# Patient Record
Sex: MALE | Race: WHITE | NOT HISPANIC OR LATINO | URBAN - METROPOLITAN AREA
[De-identification: names, ages, dates, MRNs, and addresses within clinical notes are randomized per-mention and may not be internally consistent; named-entity substitution may affect disease eponyms.]

---

## 2021-09-04 ENCOUNTER — APPOINTMENT (OUTPATIENT)
Dept: CT IMAGING | Facility: HOSPITAL | Age: 51
End: 2021-09-04

## 2021-09-04 ENCOUNTER — HOSPITAL ENCOUNTER (EMERGENCY)
Facility: HOSPITAL | Age: 51
Discharge: HOME | End: 2021-09-04
Attending: EMERGENCY MEDICINE | Admitting: EMERGENCY MEDICINE

## 2021-09-04 VITALS
HEIGHT: 69 IN | DIASTOLIC BLOOD PRESSURE: 70 MMHG | TEMPERATURE: 97.9 F | WEIGHT: 190 LBS | RESPIRATION RATE: 16 BRPM | HEART RATE: 65 BPM | BODY MASS INDEX: 28.14 KG/M2 | SYSTOLIC BLOOD PRESSURE: 123 MMHG | OXYGEN SATURATION: 96 %

## 2021-09-04 DIAGNOSIS — K57.92 ACUTE DIVERTICULITIS: Primary | ICD-10-CM

## 2021-09-04 DIAGNOSIS — K92.1 HEMATOCHEZIA: ICD-10-CM

## 2021-09-04 LAB
ABO GROUP BLD: NORMAL
ABO GROUP BLD: NORMAL
ALBUMIN SERPL-MCNC: 4.5 G/DL (ref 3.5–5.2)
ALBUMIN/GLOB SERPL: 1.7 G/DL
ALP SERPL-CCNC: 61 U/L (ref 39–117)
ALT SERPL W P-5'-P-CCNC: 26 U/L (ref 1–41)
ANION GAP SERPL CALCULATED.3IONS-SCNC: 12 MMOL/L (ref 5–15)
AST SERPL-CCNC: 24 U/L (ref 1–40)
BASOPHILS # BLD AUTO: 0.02 10*3/MM3 (ref 0–0.2)
BASOPHILS NFR BLD AUTO: 0.2 % (ref 0–1.5)
BILIRUB SERPL-MCNC: 0.7 MG/DL (ref 0–1.2)
BLD GP AB SCN SERPL QL: NEGATIVE
BUN SERPL-MCNC: 23 MG/DL (ref 6–20)
BUN/CREAT SERPL: 22.5 (ref 7–25)
CALCIUM SPEC-SCNC: 9.4 MG/DL (ref 8.6–10.5)
CHLORIDE SERPL-SCNC: 103 MMOL/L (ref 98–107)
CO2 SERPL-SCNC: 24 MMOL/L (ref 22–29)
CREAT SERPL-MCNC: 1.02 MG/DL (ref 0.76–1.27)
D-LACTATE SERPL-SCNC: 1 MMOL/L (ref 0.5–2)
DEPRECATED RDW RBC AUTO: 44.2 FL (ref 37–54)
DEVELOPER EXPIRATION DATE: ABNORMAL
DEVELOPER LOT NUMBER: ABNORMAL
EOSINOPHIL # BLD AUTO: 0.02 10*3/MM3 (ref 0–0.4)
EOSINOPHIL NFR BLD AUTO: 0.2 % (ref 0.3–6.2)
ERYTHROCYTE [DISTWIDTH] IN BLOOD BY AUTOMATED COUNT: 13.2 % (ref 12.3–15.4)
EXPIRATION DATE: ABNORMAL
FECAL OCCULT BLOOD SCREEN, POC: POSITIVE
GFR SERPL CREATININE-BSD FRML MDRD: 77 ML/MIN/1.73
GLOBULIN UR ELPH-MCNC: 2.7 GM/DL
GLUCOSE SERPL-MCNC: 113 MG/DL (ref 65–99)
HCT VFR BLD AUTO: 44.6 % (ref 37.5–51)
HGB BLD-MCNC: 14.7 G/DL (ref 13–17.7)
IMM GRANULOCYTES # BLD AUTO: 0.05 10*3/MM3 (ref 0–0.05)
IMM GRANULOCYTES NFR BLD AUTO: 0.5 % (ref 0–0.5)
LIPASE SERPL-CCNC: 34 U/L (ref 13–60)
LYMPHOCYTES # BLD AUTO: 1.14 10*3/MM3 (ref 0.7–3.1)
LYMPHOCYTES NFR BLD AUTO: 10.5 % (ref 19.6–45.3)
Lab: ABNORMAL
MCH RBC QN AUTO: 29.9 PG (ref 26.6–33)
MCHC RBC AUTO-ENTMCNC: 33 G/DL (ref 31.5–35.7)
MCV RBC AUTO: 90.8 FL (ref 79–97)
MONOCYTES # BLD AUTO: 0.31 10*3/MM3 (ref 0.1–0.9)
MONOCYTES NFR BLD AUTO: 2.9 % (ref 5–12)
NEGATIVE CONTROL: NEGATIVE
NEUTROPHILS NFR BLD AUTO: 85.7 % (ref 42.7–76)
NEUTROPHILS NFR BLD AUTO: 9.33 10*3/MM3 (ref 1.7–7)
NRBC BLD AUTO-RTO: 0 /100 WBC (ref 0–0.2)
PLATELET # BLD AUTO: 198 10*3/MM3 (ref 140–450)
PMV BLD AUTO: 10.9 FL (ref 6–12)
POSITIVE CONTROL: POSITIVE
POTASSIUM SERPL-SCNC: 4 MMOL/L (ref 3.5–5.2)
PROT SERPL-MCNC: 7.2 G/DL (ref 6–8.5)
RBC # BLD AUTO: 4.91 10*6/MM3 (ref 4.14–5.8)
RH BLD: POSITIVE
RH BLD: POSITIVE
SODIUM SERPL-SCNC: 139 MMOL/L (ref 136–145)
T&S EXPIRATION DATE: NORMAL
WBC # BLD AUTO: 10.87 10*3/MM3 (ref 3.4–10.8)

## 2021-09-04 PROCEDURE — 85025 COMPLETE CBC W/AUTO DIFF WBC: CPT | Performed by: PHYSICIAN ASSISTANT

## 2021-09-04 PROCEDURE — 25010000002 ONDANSETRON PER 1 MG: Performed by: EMERGENCY MEDICINE

## 2021-09-04 PROCEDURE — 25010000002 IOPAMIDOL 61 % SOLUTION: Performed by: EMERGENCY MEDICINE

## 2021-09-04 PROCEDURE — 80053 COMPREHEN METABOLIC PANEL: CPT | Performed by: PHYSICIAN ASSISTANT

## 2021-09-04 PROCEDURE — 96375 TX/PRO/DX INJ NEW DRUG ADDON: CPT

## 2021-09-04 PROCEDURE — 86850 RBC ANTIBODY SCREEN: CPT | Performed by: PHYSICIAN ASSISTANT

## 2021-09-04 PROCEDURE — 82270 OCCULT BLOOD FECES: CPT | Performed by: PHYSICIAN ASSISTANT

## 2021-09-04 PROCEDURE — 25010000002 HYDROMORPHONE PER 4 MG: Performed by: EMERGENCY MEDICINE

## 2021-09-04 PROCEDURE — 99283 EMERGENCY DEPT VISIT LOW MDM: CPT

## 2021-09-04 PROCEDURE — 83690 ASSAY OF LIPASE: CPT | Performed by: PHYSICIAN ASSISTANT

## 2021-09-04 PROCEDURE — 83605 ASSAY OF LACTIC ACID: CPT | Performed by: PHYSICIAN ASSISTANT

## 2021-09-04 PROCEDURE — 86900 BLOOD TYPING SEROLOGIC ABO: CPT

## 2021-09-04 PROCEDURE — 86901 BLOOD TYPING SEROLOGIC RH(D): CPT

## 2021-09-04 PROCEDURE — 86901 BLOOD TYPING SEROLOGIC RH(D): CPT | Performed by: PHYSICIAN ASSISTANT

## 2021-09-04 PROCEDURE — 86900 BLOOD TYPING SEROLOGIC ABO: CPT | Performed by: PHYSICIAN ASSISTANT

## 2021-09-04 PROCEDURE — 96374 THER/PROPH/DIAG INJ IV PUSH: CPT

## 2021-09-04 PROCEDURE — 74177 CT ABD & PELVIS W/CONTRAST: CPT

## 2021-09-04 RX ORDER — AMOXICILLIN AND CLAVULANATE POTASSIUM 875; 125 MG/1; MG/1
1 TABLET, FILM COATED ORAL 2 TIMES DAILY
Qty: 20 TABLET | Refills: 0 | Status: SHIPPED | OUTPATIENT
Start: 2021-09-04

## 2021-09-04 RX ORDER — ONDANSETRON 2 MG/ML
4 INJECTION INTRAMUSCULAR; INTRAVENOUS ONCE
Status: COMPLETED | OUTPATIENT
Start: 2021-09-04 | End: 2021-09-04

## 2021-09-04 RX ORDER — HYDROMORPHONE HYDROCHLORIDE 1 MG/ML
0.25 INJECTION, SOLUTION INTRAMUSCULAR; INTRAVENOUS; SUBCUTANEOUS ONCE
Status: COMPLETED | OUTPATIENT
Start: 2021-09-04 | End: 2021-09-04

## 2021-09-04 RX ORDER — DICYCLOMINE HCL 20 MG
20 TABLET ORAL EVERY 6 HOURS
Qty: 12 TABLET | Refills: 0 | Status: SHIPPED | OUTPATIENT
Start: 2021-09-04

## 2021-09-04 RX ADMIN — HYDROMORPHONE HYDROCHLORIDE 0.25 MG: 1 INJECTION, SOLUTION INTRAMUSCULAR; INTRAVENOUS; SUBCUTANEOUS at 14:40

## 2021-09-04 RX ADMIN — ONDANSETRON 4 MG: 2 INJECTION INTRAMUSCULAR; INTRAVENOUS at 14:38

## 2021-09-04 RX ADMIN — IOPAMIDOL 85 ML: 612 INJECTION, SOLUTION INTRAVENOUS at 11:45

## 2021-09-04 RX ADMIN — SODIUM CHLORIDE 1000 ML: 9 INJECTION, SOLUTION INTRAVENOUS at 10:57

## 2021-09-04 NOTE — ED PROVIDER NOTES
Subjective   51-year-old male presents emergency department today after being seen at the [facility] and sent here for further evaluation.  Said some blood in his loose stools over the past 2 days.  Patient is a colonoscopy within the past year.  He reports that he has had no prior history of colitis diverticulitis or otherwise.  Said no fevers no chills.  He had no rectal trauma.  He has had a lower abdominal cramping associated with this.  No other complaints.      History provided by:  Patient   used: No    Rectal Bleeding  Quality:  Bright red  Amount:  Moderate  Duration:  12 hours  Timing:  Intermittent  Chronicity:  New  Context: defecation    Context: not anal fissures, not anal penetration, not constipation, not foreign body and not spontaneously    Similar prior episodes: no    Relieved by:  Nothing  Worsened by:  Defecation  Ineffective treatments:  None tried  Associated symptoms: abdominal pain    Associated symptoms: no dizziness, no epistaxis, no fever, no hematemesis, no light-headedness, no loss of consciousness, no recent illness and no vomiting    Risk factors: no anticoagulant use, no hx of colorectal cancer and no hx of colorectal surgery        Review of Systems   Constitutional: Negative for appetite change and fever.   HENT: Negative for nosebleeds.    Respiratory: Negative for chest tightness, shortness of breath and wheezing.    Cardiovascular: Negative for chest pain and palpitations.   Gastrointestinal: Positive for abdominal pain and hematochezia. Negative for hematemesis and vomiting.   Genitourinary: Negative for dysuria, frequency and urgency.   Musculoskeletal: Negative for back pain and neck pain.   Skin: Negative for pallor and rash.   Neurological: Negative for dizziness, loss of consciousness and light-headedness.   Psychiatric/Behavioral: Negative.    All other systems reviewed and are negative.      History reviewed. No pertinent past medical history.    No Known  Allergies    History reviewed. No pertinent surgical history.    History reviewed. No pertinent family history.    Social History     Socioeconomic History   • Marital status:      Spouse name: Not on file   • Number of children: Not on file   • Years of education: Not on file   • Highest education level: Not on file   Tobacco Use   • Smoking status: Never Smoker   • Smokeless tobacco: Never Used   Vaping Use   • Vaping Use: Never used   Substance and Sexual Activity   • Alcohol use: Yes     Comment: rare   • Drug use: Never   • Sexual activity: Defer           Objective   Physical Exam  Vitals and nursing note reviewed.   Constitutional:       Appearance: He is well-developed.   HENT:      Head: Normocephalic and atraumatic.      Right Ear: External ear normal.      Left Ear: External ear normal.      Nose: Nose normal.   Eyes:      General: No scleral icterus.     Conjunctiva/sclera: Conjunctivae normal.      Pupils: Pupils are equal, round, and reactive to light.   Neck:      Thyroid: No thyromegaly.   Cardiovascular:      Rate and Rhythm: Normal rate and regular rhythm.      Heart sounds: Normal heart sounds.   Pulmonary:      Effort: Pulmonary effort is normal. No respiratory distress.      Breath sounds: Normal breath sounds. No wheezing or rales.   Chest:      Chest wall: No tenderness.   Abdominal:      General: Bowel sounds are normal. There is no distension.      Palpations: Abdomen is soft.      Tenderness: There is abdominal tenderness in the suprapubic area. There is no right CVA tenderness, left CVA tenderness, guarding or rebound. Negative signs include Minaya's sign, Rovsing's sign, McBurney's sign, psoas sign and obturator sign.   Musculoskeletal:         General: Normal range of motion.      Cervical back: Normal range of motion.   Lymphadenopathy:      Cervical: No cervical adenopathy.   Skin:     General: Skin is warm and dry.   Neurological:      Mental Status: He is alert and oriented to  person, place, and time.      Cranial Nerves: No cranial nerve deficit.      Coordination: Coordination normal.      Deep Tendon Reflexes: Reflexes are normal and symmetric. Reflexes normal.   Psychiatric:         Behavior: Behavior normal.         Thought Content: Thought content normal.         Judgment: Judgment normal.         Procedures           ED Course                                 Recent Results (from the past 24 hour(s))   POCT Hemoglobin    Collection Time: 09/04/21  9:47 AM    Specimen: Blood   Result Value Ref Range    Hemoglobin 14.8 12.0 - 17.0 g/dL   Comprehensive Metabolic Panel    Collection Time: 09/04/21 10:57 AM    Specimen: Blood   Result Value Ref Range    Glucose 113 (H) 65 - 99 mg/dL    BUN 23 (H) 6 - 20 mg/dL    Creatinine 1.02 0.76 - 1.27 mg/dL    Sodium 139 136 - 145 mmol/L    Potassium 4.0 3.5 - 5.2 mmol/L    Chloride 103 98 - 107 mmol/L    CO2 24.0 22.0 - 29.0 mmol/L    Calcium 9.4 8.6 - 10.5 mg/dL    Total Protein 7.2 6.0 - 8.5 g/dL    Albumin 4.50 3.50 - 5.20 g/dL    ALT (SGPT) 26 1 - 41 U/L    AST (SGOT) 24 1 - 40 U/L    Alkaline Phosphatase 61 39 - 117 U/L    Total Bilirubin 0.7 0.0 - 1.2 mg/dL    eGFR Non African Amer 77 >60 mL/min/1.73    Globulin 2.7 gm/dL    A/G Ratio 1.7 g/dL    BUN/Creatinine Ratio 22.5 7.0 - 25.0    Anion Gap 12.0 5.0 - 15.0 mmol/L   Lactic Acid, Plasma    Collection Time: 09/04/21 10:57 AM    Specimen: Blood   Result Value Ref Range    Lactate 1.0 0.5 - 2.0 mmol/L   Lipase    Collection Time: 09/04/21 10:57 AM    Specimen: Blood   Result Value Ref Range    Lipase 34 13 - 60 U/L   CBC Auto Differential    Collection Time: 09/04/21 10:57 AM    Specimen: Blood   Result Value Ref Range    WBC 10.87 (H) 3.40 - 10.80 10*3/mm3    RBC 4.91 4.14 - 5.80 10*6/mm3    Hemoglobin 14.7 13.0 - 17.7 g/dL    Hematocrit 44.6 37.5 - 51.0 %    MCV 90.8 79.0 - 97.0 fL    MCH 29.9 26.6 - 33.0 pg    MCHC 33.0 31.5 - 35.7 g/dL    RDW 13.2 12.3 - 15.4 %    RDW-SD 44.2 37.0 - 54.0  fl    MPV 10.9 6.0 - 12.0 fL    Platelets 198 140 - 450 10*3/mm3    Neutrophil % 85.7 (H) 42.7 - 76.0 %    Lymphocyte % 10.5 (L) 19.6 - 45.3 %    Monocyte % 2.9 (L) 5.0 - 12.0 %    Eosinophil % 0.2 (L) 0.3 - 6.2 %    Basophil % 0.2 0.0 - 1.5 %    Immature Grans % 0.5 0.0 - 0.5 %    Neutrophils, Absolute 9.33 (H) 1.70 - 7.00 10*3/mm3    Lymphocytes, Absolute 1.14 0.70 - 3.10 10*3/mm3    Monocytes, Absolute 0.31 0.10 - 0.90 10*3/mm3    Eosinophils, Absolute 0.02 0.00 - 0.40 10*3/mm3    Basophils, Absolute 0.02 0.00 - 0.20 10*3/mm3    Immature Grans, Absolute 0.05 0.00 - 0.05 10*3/mm3    nRBC 0.0 0.0 - 0.2 /100 WBC   Type & Screen    Collection Time: 09/04/21 11:10 AM    Specimen: Blood   Result Value Ref Range    ABO Type A     RH type Positive     Antibody Screen Negative     T&S Expiration Date 9/7/2021 11:59:59 PM    POC Occult Blood Stool    Collection Time: 09/04/21 11:29 AM    Specimen: Per Rectum; Stool   Result Value Ref Range    Fecal Occult Blood Positive (A) Negative    Lot Number [id number]     Expiration Date 2/2,024     DEVELOPER LOT NUMBER [id number]     DEVELOPER EXPIRATION DATE 6/2,024     Positive Control Positive Positive    Negative Control Negative Negative   ABO RH Specimen Verification    Collection Time: 09/04/21 12:31 PM    Specimen: Blood   Result Value Ref Range    ABO Type A     RH type Positive      Note: In addition to lab results from this visit, the labs listed above may include labs taken at another facility or during a different encounter within the last 24 hours. Please correlate lab times with ED admission and discharge times for further clarification of the services performed during this visit.    CT Abdomen Pelvis With Contrast   Final Result   No acute findings in the abdomen and pelvis.       Likely incidental prominent presumed diverticulum of the transverse   portion of the duodenum.           This report was finalized on 9/4/2021 12:24 PM by Ryan Rolle.            Vitals:     09/04/21 1445 09/04/21 1500 09/04/21 1515 09/04/21 1526   BP:    123/70   BP Location:       Patient Position:       Pulse:    65   Resp:    16   Temp:       SpO2: 96% 93% 95% 96%   Weight:       Height:         Medications   sodium chloride 0.9 % bolus 1,000 mL (0 mL Intravenous Stopped 9/4/21 1437)   iopamidol (ISOVUE-300) 61 % injection 100 mL (85 mL Intravenous Given 9/4/21 1145)   HYDROmorphone (DILAUDID) injection 0.25 mg (0.25 mg Intravenous Given 9/4/21 1440)   ondansetron (ZOFRAN) injection 4 mg (4 mg Intravenous Given 9/4/21 1438)     ECG/EMG Results (last 24 hours)     ** No results found for the last 24 hours. **        No orders to display                 MDM  Number of Diagnoses or Management Options  Acute diverticulitis: new and requires workup  Hematochezia: new and requires workup     Amount and/or Complexity of Data Reviewed  Clinical lab tests: reviewed and ordered  Tests in the radiology section of CPT®: reviewed and ordered  Tests in the medicine section of CPT®: ordered and reviewed  Discuss the patient with other providers: yes    Patient Progress  Patient progress: stable      Final diagnoses:   Acute diverticulitis   Hematochezia       ED Disposition  ED Disposition     ED Disposition Condition Comment    Discharge Stable           PATIENT CONNECTION - [facility] [address]  [phone]        [facility] Emergency Department  [address] [id number] [phone]    If symptoms worsen         Medication List      New Prescriptions    amoxicillin-clavulanate 875-125 MG per tablet  Commonly known as: AUGMENTIN  Take 1 tablet by mouth 2 (Two) Times a Day.     dicyclomine 20 MG tablet  Commonly known as: BENTYL  Take 1 tablet by mouth Every 6 (Six) Hours.           Where to Get Your Medications      These medications were sent to [id number] - [place], [place] - [address] [place] [address] - [phone] [facility] [phone] [facility]  [place] [id number]    Phone: [phone]   · amoxicillin-clavulanate 875-125 MG per tablet  · dicyclomine 20 MG tablet          Power Alexander PA  09/05/21 0755

## 2024-12-23 ENCOUNTER — APPOINTMENT (OUTPATIENT)
Dept: CT IMAGING | Facility: HOSPITAL | Age: 54
End: 2024-12-23
Payer: COMMERCIAL

## 2024-12-23 ENCOUNTER — HOSPITAL ENCOUNTER (EMERGENCY)
Facility: HOSPITAL | Age: 54
Discharge: HOME | End: 2024-12-23
Attending: STUDENT IN AN ORGANIZED HEALTH CARE EDUCATION/TRAINING PROGRAM | Admitting: STUDENT IN AN ORGANIZED HEALTH CARE EDUCATION/TRAINING PROGRAM
Payer: COMMERCIAL

## 2024-12-23 ENCOUNTER — APPOINTMENT (OUTPATIENT)
Dept: GENERAL RADIOLOGY | Facility: HOSPITAL | Age: 54
End: 2024-12-23
Payer: COMMERCIAL

## 2024-12-23 VITALS
HEIGHT: 69 IN | OXYGEN SATURATION: 95 % | HEART RATE: 73 BPM | TEMPERATURE: 97.7 F | RESPIRATION RATE: 16 BRPM | DIASTOLIC BLOOD PRESSURE: 112 MMHG | SYSTOLIC BLOOD PRESSURE: 140 MMHG | BODY MASS INDEX: 28.14 KG/M2 | WEIGHT: 190 LBS

## 2024-12-23 DIAGNOSIS — M54.6 ACUTE LEFT-SIDED THORACIC BACK PAIN: ICD-10-CM

## 2024-12-23 DIAGNOSIS — R03.0 TRANSIENT HYPERTENSION: ICD-10-CM

## 2024-12-23 DIAGNOSIS — R07.9 CHEST PAIN, UNSPECIFIED TYPE: Primary | ICD-10-CM

## 2024-12-23 LAB
ALBUMIN SERPL-MCNC: 4.3 G/DL (ref 3.5–5.2)
ALBUMIN/GLOB SERPL: 1.4 G/DL
ALP SERPL-CCNC: 67 U/L (ref 39–117)
ALT SERPL W P-5'-P-CCNC: 44 U/L (ref 1–41)
ANION GAP SERPL CALCULATED.3IONS-SCNC: 9 MMOL/L (ref 5–15)
AST SERPL-CCNC: 32 U/L (ref 1–40)
BASOPHILS # BLD AUTO: 0.02 10*3/MM3 (ref 0–0.2)
BASOPHILS NFR BLD AUTO: 0.3 % (ref 0–1.5)
BILIRUB SERPL-MCNC: 0.5 MG/DL (ref 0–1.2)
BUN SERPL-MCNC: 18 MG/DL (ref 6–20)
BUN/CREAT SERPL: 15.7 (ref 7–25)
CALCIUM SPEC-SCNC: 9.2 MG/DL (ref 8.6–10.5)
CHLORIDE SERPL-SCNC: 104 MMOL/L (ref 98–107)
CO2 SERPL-SCNC: 29 MMOL/L (ref 22–29)
CREAT SERPL-MCNC: 1.15 MG/DL (ref 0.76–1.27)
DEPRECATED RDW RBC AUTO: 41.4 FL (ref 37–54)
EGFRCR SERPLBLD CKD-EPI 2021: 75.6 ML/MIN/1.73
EOSINOPHIL # BLD AUTO: 0.05 10*3/MM3 (ref 0–0.4)
EOSINOPHIL NFR BLD AUTO: 0.7 % (ref 0.3–6.2)
ERYTHROCYTE [DISTWIDTH] IN BLOOD BY AUTOMATED COUNT: 12.7 % (ref 12.3–15.4)
GEN 5 1HR TROPONIN T REFLEX: 6 NG/L
GLOBULIN UR ELPH-MCNC: 3 GM/DL
GLUCOSE SERPL-MCNC: 91 MG/DL (ref 65–99)
HCT VFR BLD AUTO: 46.4 % (ref 37.5–51)
HGB BLD-MCNC: 15.6 G/DL (ref 13–17.7)
HOLD SPECIMEN: NORMAL
IMM GRANULOCYTES # BLD AUTO: 0.01 10*3/MM3 (ref 0–0.05)
IMM GRANULOCYTES NFR BLD AUTO: 0.1 % (ref 0–0.5)
LIPASE SERPL-CCNC: 60 U/L (ref 13–60)
LYMPHOCYTES # BLD AUTO: 2.5 10*3/MM3 (ref 0.7–3.1)
LYMPHOCYTES NFR BLD AUTO: 35.1 % (ref 19.6–45.3)
MCH RBC QN AUTO: 30 PG (ref 26.6–33)
MCHC RBC AUTO-ENTMCNC: 33.6 G/DL (ref 31.5–35.7)
MCV RBC AUTO: 89.2 FL (ref 79–97)
MONOCYTES # BLD AUTO: 0.43 10*3/MM3 (ref 0.1–0.9)
MONOCYTES NFR BLD AUTO: 6 % (ref 5–12)
NEUTROPHILS NFR BLD AUTO: 4.12 10*3/MM3 (ref 1.7–7)
NEUTROPHILS NFR BLD AUTO: 57.8 % (ref 42.7–76)
NRBC BLD AUTO-RTO: 0 /100 WBC (ref 0–0.2)
NT-PROBNP SERPL-MCNC: <36 PG/ML (ref 0–900)
PLATELET # BLD AUTO: 219 10*3/MM3 (ref 140–450)
PMV BLD AUTO: 10.2 FL (ref 6–12)
POTASSIUM SERPL-SCNC: 4.3 MMOL/L (ref 3.5–5.2)
PROT SERPL-MCNC: 7.3 G/DL (ref 6–8.5)
RBC # BLD AUTO: 5.2 10*6/MM3 (ref 4.14–5.8)
SODIUM SERPL-SCNC: 142 MMOL/L (ref 136–145)
TROPONIN T NUMERIC DELTA: 0 NG/L
TROPONIN T SERPL HS-MCNC: 6 NG/L
WBC NRBC COR # BLD AUTO: 7.13 10*3/MM3 (ref 3.4–10.8)
WHOLE BLOOD HOLD COAG: NORMAL
WHOLE BLOOD HOLD SPECIMEN: NORMAL

## 2024-12-23 PROCEDURE — 36415 COLL VENOUS BLD VENIPUNCTURE: CPT

## 2024-12-23 PROCEDURE — 71275 CT ANGIOGRAPHY CHEST: CPT

## 2024-12-23 PROCEDURE — 99285 EMERGENCY DEPT VISIT HI MDM: CPT

## 2024-12-23 PROCEDURE — 84484 ASSAY OF TROPONIN QUANT: CPT | Performed by: STUDENT IN AN ORGANIZED HEALTH CARE EDUCATION/TRAINING PROGRAM

## 2024-12-23 PROCEDURE — 25510000001 IOPAMIDOL PER 1 ML: Performed by: STUDENT IN AN ORGANIZED HEALTH CARE EDUCATION/TRAINING PROGRAM

## 2024-12-23 PROCEDURE — 83880 ASSAY OF NATRIURETIC PEPTIDE: CPT | Performed by: STUDENT IN AN ORGANIZED HEALTH CARE EDUCATION/TRAINING PROGRAM

## 2024-12-23 PROCEDURE — 93005 ELECTROCARDIOGRAM TRACING: CPT | Performed by: STUDENT IN AN ORGANIZED HEALTH CARE EDUCATION/TRAINING PROGRAM

## 2024-12-23 PROCEDURE — 83690 ASSAY OF LIPASE: CPT

## 2024-12-23 PROCEDURE — 71045 X-RAY EXAM CHEST 1 VIEW: CPT

## 2024-12-23 PROCEDURE — 85025 COMPLETE CBC W/AUTO DIFF WBC: CPT

## 2024-12-23 PROCEDURE — 93005 ELECTROCARDIOGRAM TRACING: CPT

## 2024-12-23 PROCEDURE — 80053 COMPREHEN METABOLIC PANEL: CPT | Performed by: STUDENT IN AN ORGANIZED HEALTH CARE EDUCATION/TRAINING PROGRAM

## 2024-12-23 RX ORDER — SODIUM CHLORIDE 0.9 % (FLUSH) 0.9 %
10 SYRINGE (ML) INJECTION AS NEEDED
Status: DISCONTINUED | OUTPATIENT
Start: 2024-12-23 | End: 2024-12-24 | Stop reason: HOSPADM

## 2024-12-23 RX ORDER — IBUPROFEN 600 MG/1
600 TABLET, FILM COATED ORAL EVERY 6 HOURS PRN
Qty: 20 TABLET | Refills: 0 | Status: SHIPPED | OUTPATIENT
Start: 2024-12-23

## 2024-12-23 RX ORDER — ALUMINA, MAGNESIA, AND SIMETHICONE 2400; 2400; 240 MG/30ML; MG/30ML; MG/30ML
30 SUSPENSION ORAL ONCE
Status: COMPLETED | OUTPATIENT
Start: 2024-12-23 | End: 2024-12-23

## 2024-12-23 RX ORDER — ASPIRIN 81 MG/1
324 TABLET, CHEWABLE ORAL ONCE
Status: COMPLETED | OUTPATIENT
Start: 2024-12-23 | End: 2024-12-23

## 2024-12-23 RX ORDER — IOPAMIDOL 755 MG/ML
85 INJECTION, SOLUTION INTRAVASCULAR
Status: COMPLETED | OUTPATIENT
Start: 2024-12-23 | End: 2024-12-23

## 2024-12-23 RX ADMIN — ALUMINUM HYDROXIDE, MAGNESIUM HYDROXIDE, DIMETHICONE 30 ML: 400; 400; 40 SUSPENSION ORAL at 21:43

## 2024-12-23 RX ADMIN — ASPIRIN 81 MG 324 MG: 81 TABLET ORAL at 17:55

## 2024-12-23 RX ADMIN — IOPAMIDOL 85 ML: 755 INJECTION, SOLUTION INTRAVENOUS at 20:02

## 2024-12-24 NOTE — DISCHARGE INSTRUCTIONS
Try the provided medications to see if that helps with symptoms.  You will be contacted with cardiology follow-up.  While today's workup was reassuring if symptoms change or worsen please return to the ED or seek other medical care.

## 2024-12-24 NOTE — ED PROVIDER NOTES
EMERGENCY DEPARTMENT ENCOUNTER    Pt Name: Nicho Hammond  MRN: [id number]  Pt :   1970  Room Number:    Date of encounter:  2024  PCP: Provider, No Known  ED Provider: Emeterio Coronado MD    Historian: Patient, spouse      HPI:  Chief Complaint: Chest pain        Context: Nicho Hammond is a 54-year-old man with history of GERD who presents for acute onset severe left-sided chest pain rating to the back.  He said it started this morning when he was eating breakfast he has not appreciated to be exertional or positional the left anterior chest pain lasted about 20 minutes and then improved but the pain has now started to radiate to just under his left shoulder blade.  He describes that pain is much milder he went to his PCP where an ECG showed anterior Q waves and he was told to go to the emergency room right away.  Upon arrival here he continues to describe mild back pain but says the anterior pain has resolved almost completely.  He denies recent illness or fevers.  No other complaints at this time.      PAST MEDICAL HISTORY  No past medical history on file.      PAST SURGICAL HISTORY  No past surgical history on file.      FAMILY HISTORY  No family history on file.      SOCIAL HISTORY  Social History     Socioeconomic History    Marital status:    Tobacco Use    Smoking status: Never    Smokeless tobacco: Never   Vaping Use    Vaping status: Never Used   Substance and Sexual Activity    Alcohol use: Yes     Comment: rare    Drug use: Never    Sexual activity: Defer         ALLERGIES  Patient has no known allergies.        REVIEW OF SYSTEMS  Review of Systems       All systems reviewed and negative except for those discussed in HPI.       PHYSICAL EXAM    I have reviewed the triage vital signs and nursing notes.    ED Triage Vitals [24 1744]   Temp Heart Rate Resp BP SpO2   97.7 °F (36.5 °C) 86 16 (!) 186/97 99 %      Temp src Heart Rate Source Patient Position BP Location FiO2  (%)   Oral Monitor Sitting Left arm --       Physical Exam  GENERAL:   Appears in no acute distress.   HENT: Nares patent.  EYES: No scleral icterus.  CV: Regular rhythm, regular rate.  RESPIRATORY: Normal effort.  No audible wheezes, rales or rhonchi.  ABDOMEN: Soft, nontender  MUSCULOSKELETAL: No deformities.   NEURO: Alert, moves all extremities, follows commands.  SKIN: Warm, dry, no rash visualized.      LAB RESULTS  Recent Results (from the past 24 hours)   ECG 12 Lead ED Triage Standing Order; Chest Pain    Collection Time: 12/23/24  5:51 PM   Result Value Ref Range    QT Interval 386 ms    QTC Interval 422 ms   Comprehensive Metabolic Panel    Collection Time: 12/23/24  5:55 PM    Specimen: Blood   Result Value Ref Range    Glucose 91 65 - 99 mg/dL    BUN 18 6 - 20 mg/dL    Creatinine 1.15 0.76 - 1.27 mg/dL    Sodium 142 136 - 145 mmol/L    Potassium 4.3 3.5 - 5.2 mmol/L    Chloride 104 98 - 107 mmol/L    CO2 29.0 22.0 - 29.0 mmol/L    Calcium 9.2 8.6 - 10.5 mg/dL    Total Protein 7.3 6.0 - 8.5 g/dL    Albumin 4.3 3.5 - 5.2 g/dL    ALT (SGPT) 44 (H) 1 - 41 U/L    AST (SGOT) 32 1 - 40 U/L    Alkaline Phosphatase 67 39 - 117 U/L    Total Bilirubin 0.5 0.0 - 1.2 mg/dL    Globulin 3.0 gm/dL    A/G Ratio 1.4 g/dL    BUN/Creatinine Ratio 15.7 7.0 - 25.0    Anion Gap 9.0 5.0 - 15.0 mmol/L    eGFR 75.6 >60.0 mL/min/1.73   Lipase    Collection Time: 12/23/24  5:55 PM    Specimen: Blood   Result Value Ref Range    Lipase 60 13 - 60 U/L   BNP    Collection Time: 12/23/24  5:55 PM    Specimen: Blood   Result Value Ref Range    proBNP <36.0 0.0 - 900.0 pg/mL   Green Top (Gel)    Collection Time: 12/23/24  5:55 PM   Result Value Ref Range    Extra Tube Hold for add-ons.    Lavender Top    Collection Time: 12/23/24  5:55 PM   Result Value Ref Range    Extra Tube hold for add-on    Gold Top - SST    Collection Time: 12/23/24  5:55 PM   Result Value Ref Range    Extra Tube Hold for add-ons.    Gray Top    Collection Time:  12/23/24  5:55 PM   Result Value Ref Range    Extra Tube Hold for add-ons.    Light Blue Top    Collection Time: 12/23/24  5:55 PM   Result Value Ref Range    Extra Tube Hold for add-ons.    CBC Auto Differential    Collection Time: 12/23/24  5:55 PM    Specimen: Blood   Result Value Ref Range    WBC 7.13 3.40 - 10.80 10*3/mm3    RBC 5.20 4.14 - 5.80 10*6/mm3    Hemoglobin 15.6 13.0 - 17.7 g/dL    Hematocrit 46.4 37.5 - 51.0 %    MCV 89.2 79.0 - 97.0 fL    MCH 30.0 26.6 - 33.0 pg    MCHC 33.6 31.5 - 35.7 g/dL    RDW 12.7 12.3 - 15.4 %    RDW-SD 41.4 37.0 - 54.0 fl    MPV 10.2 6.0 - 12.0 fL    Platelets 219 140 - 450 10*3/mm3    Neutrophil % 57.8 42.7 - 76.0 %    Lymphocyte % 35.1 19.6 - 45.3 %    Monocyte % 6.0 5.0 - 12.0 %    Eosinophil % 0.7 0.3 - 6.2 %    Basophil % 0.3 0.0 - 1.5 %    Immature Grans % 0.1 0.0 - 0.5 %    Neutrophils, Absolute 4.12 1.70 - 7.00 10*3/mm3    Lymphocytes, Absolute 2.50 0.70 - 3.10 10*3/mm3    Monocytes, Absolute 0.43 0.10 - 0.90 10*3/mm3    Eosinophils, Absolute 0.05 0.00 - 0.40 10*3/mm3    Basophils, Absolute 0.02 0.00 - 0.20 10*3/mm3    Immature Grans, Absolute 0.01 0.00 - 0.05 10*3/mm3    nRBC 0.0 0.0 - 0.2 /100 WBC   High Sensitivity Troponin T    Collection Time: 12/23/24  6:17 PM    Specimen: Blood   Result Value Ref Range    HS Troponin T 6 <22 ng/L   High Sensitivity Troponin T 1Hr    Collection Time: 12/23/24  7:27 PM    Specimen: Blood   Result Value Ref Range    HS Troponin T 6 <22 ng/L    Troponin T Numeric Delta 0 Abnormal if >/=3 ng/L   ECG 12 Lead ED Triage Standing Order; Chest Pain    Collection Time: 12/23/24  7:51 PM   Result Value Ref Range    QT Interval 388 ms    QTC Interval 412 ms       If labs were ordered, I independently reviewed the results and considered them in treating the patient.        RADIOLOGY  CT Angiogram Chest    Result Date: 12/23/2024  CT ANGIOGRAM CHEST Date of Exam: 12/23/2024 7:54 PM EST Indication: Acute severe left chest pain rating to the  back, hypertension. Comparison: None available. Technique: CTA of the chest was performed after the uneventful intravenous administration of 85 cc Isovue-370 IV contrast . Reconstructed coronal and sagittal images were also obtained. In addition, a 3-D volume rendered image was created for interpretation. Automated exposure control and iterative reconstruction methods were used. Findings: The thyroid gland is normal. The subglottic airway is clear. No evidence of aortic dissection. Moderate atheromatous disease of the coronary vessels. No pulmonary embolus. No consolidation. No pneumothorax or pleural effusion. No mediastinal, perihilar, or axillary adenopathy. The visualized upper abdomen is normal. Thoracic vertebral body height and alignment are normal. No lytic or blastic disease. No rib fractures.     No acute cardiopulmonary disease. No evidence of aortic dissection. No pulmonary embolus. Moderate atheromatous disease of the coronary vessels. Electronically Signed: Nicho Galo MD  12/23/2024 8:33 PM EST  Workstation ID: [id number]    XR Chest 1 View    Result Date: 12/23/2024  XR CHEST 1 VW Date of Exam: 12/23/2024 6:20 PM EST Indication: Chest Pain Triage Protocol Comparison: None available. Findings: Mediastinum: Cardiac silhouette appears normal in size Lungs: The lungs appear clear without focal consolidation appreciated. Pleura: No pleural effusion or pneumothorax. Bones and soft tissues: No acute, displaced fracture seen.     Impression: No radiographic evidence of acute cardiopulmonary abnormality. Electronically Signed: Constantin Lewis  12/23/2024 6:49 PM EST  Workstation ID: [id number]     I ordered and independently reviewed the above noted radiographic studies.      I viewed images of chest x-ray which showed no acute pathology per my independent interpretation.  CTA of the chest which did not show aortic injury, pulmonary embolism, or other acute pathology that I can appreciate    See radiologist's  dictation for official interpretation.        PROCEDURES    Procedures    ECG 12 Lead ED Triage Standing Order; Chest Pain   Preliminary Result   Test Reason : ED Triage Standing Order~   Blood Pressure :   */*   mmHG   Vent. Rate :  68 BPM     Atrial Rate :  68 BPM      P-R Int : 166 ms          QRS Dur : 102 ms       QT Int : 388 ms       P-R-T Axes :  37  21  37 degrees     QTcB Int : 412 ms      Normal sinus rhythm   Normal ECG   When compared with ECG of 23-Dec-2024 17:51, (Unconfirmed)   No significant change was found      Referred By: EDMD           Confirmed By:       ECG 12 Lead ED Triage Standing Order; Chest Pain   Preliminary Result   Test Reason : ED Triage Standing Order~   Blood Pressure :   */*   mmHG   Vent. Rate :  72 BPM     Atrial Rate :  72 BPM      P-R Int : 154 ms          QRS Dur : 104 ms       QT Int : 386 ms       P-R-T Axes :  41  12  53 degrees     QTcB Int : 422 ms      Normal sinus rhythm   Incomplete right bundle branch block   Borderline ECG   No previous ECGs available      Referred By: EDMD           Confirmed By:           MEDICATIONS GIVEN IN ER    Medications   sodium chloride 0.9 % flush 10 mL (has no administration in time range)   aspirin chewable tablet 324 mg (324 mg Oral Given 12/23/24 1755)   iopamidol (ISOVUE-370) 76 % injection 85 mL (85 mL Intravenous Given 12/23/24 2002)   aluminum-magnesium hydroxide-simethicone (MAALOX MAX) 400-400-40 MG/5ML suspension 30 mL (30 mL Oral Given 12/23/24 2143)         MEDICAL DECISION MAKING, PROGRESS, and CONSULTS    All labs, if obtained, have been independently reviewed by me.  All radiology studies, if obtained, have been reviewed by me and the radiologist dictating the report.  All EKG's, if obtained, have been independently viewed and interpreted by me/my attending physician.      Discussion below represents my analysis of pertinent findings related to patient's condition, differential diagnosis, treatment plan and final  disposition.              HEART Score: 2   Shared Decision Making  I discussed the findings with the patient/patient representative who is in agreement with the treatment plan and the final disposition.  Risks and benefits of discharge and/or observation/admission were discussed: Yes                             Differential diagnosis:    Aortic dissection, myocardial infarction, pneumonia, pneumothorax, pulmonary embolism, GERD, esophagitis, sepsis, anemia, electrolyte abnormality      Additional sources:    - Discussed/ obtained information from independent historians: Spouse    - External (non-ED) record review:  Very few available charts on chart review, urgent care note for abdominal pain and prior ED note for diverticulitis    - Chronic or social conditions impacting care: None        Orders placed during this visit:  Orders Placed This Encounter   Procedures    XR Chest 1 View    CT Angiogram Chest    [place] Draw    High Sensitivity Troponin T    Comprehensive Metabolic Panel    Lipase    BNP    CBC Auto Differential    High Sensitivity Troponin T 1Hr    Ambulatory Referral to [facility]    NPO Diet NPO Type: Strict NPO    Undress & Gown    Continuous Pulse Oximetry    Oxygen Therapy- Nasal Cannula; Titrate 1-6 LPM Per SpO2; 90 - 95%    ECG 12 Lead ED Triage Standing Order; Chest Pain    ECG 12 Lead ED Triage Standing Order; Chest Pain    Insert Peripheral IV    CBC & Differential    Green Top (Gel)    Lavender Top    Gold Top - SST    Gray Top    Light Blue Top         Additional orders considered but not ordered:      ED Course:    Consultants:      ED Course as of 12/23/24 2232   Mon Dec 23, 2024   1839 Very few available charts on chart review, urgent care note for abdominal pain and prior ED note for diverticulitis. [CC]   1848 This very nice 54-year-old man with history of GERD who presents for acute onset severe left-sided chest pain rating to the back.  He said it started this morning  when he was eating breakfast he has not appreciated to be exertional or positional the left anterior chest pain lasted about 20 minutes and then improved but the pain has now started to radiate to just under his left shoulder blade.  He describes that pain is much milder he went to his PCP where an ECG showed anterior Q waves and he was told to go to the emergency room right away.  Upon arrival here he continues to describe mild back pain but says the anterior pain has resolved almost completely.  He denies recent illness or fevers.  No other complaints at this time. [CC]   1847 He arrived awake and alert he is significantly hypertensive 186/97 with his description of acute anterior chest pain rating to the back I have concern for aortic dissection so have ordered CTA of the chest obtaining full cardiac workup as well. [CC]   1848 ECG here personally interpreted rate of 72 and incomplete right bundle but no acute ST elevations or depressions no significant Q waves, axis abnormality, or T wave abnormalities that I can appreciate. [CC]   1848 PCPs ECG personally reviewed regular rate and rhythm at a rate of 72, normal axis, borderline enlarged lateral Q waves potentially pointing towards old infarction. [CC]   2230 Repeat ECG shows no abnormalities and does not of the Q waves that were borderline present on the outside ECG.  CTA of the chest fortunately not showing aortic injury or pulmonary embolism.  Both 0 and 2-hour troponin are not elevated and have no delta.  Likewise no elevation in proBNP.  CBC and CMP reassuring and nonactionable.  He remains well upon reevaluation we tried a GI cocktail without significant improvement in his pain think this may be inflammatory in nature.  We have discussed that with his family history even with today's workup being reassuring I would like him to follow-up with cardiology he is agreeable to this plan.  His initial hypertension has resolved without intervention I think there  may have been a stress component to this.  Discharged in stable condition with cardiology follow-up and return precautions. [CC]      ED Course User Index  [CC] Emeterio Coronado MD              Shared Decision Making:  After my consideration of clinical presentation and any laboratory/radiology studies obtained, I discussed the findings with the patient/patient representative who is in agreement with the treatment plan and the final disposition.   Risks and benefits of discharge and/or observation/admission were discussed.       AS OF 22:32 EST VITALS:    BP - (!) 140/112  HR - 73  TEMP - 97.7 °F (36.5 °C) (Oral)  O2 SATS - 95%                  DIAGNOSIS  Final diagnoses:   Chest pain, unspecified type   Acute left-sided thoracic back pain   Transient hypertension         DISPOSITION  DISCHARGE    Patient discharged in stable condition.    Reviewed implications of results, diagnosis, meds, responsibility to follow up, warning signs and symptoms of possible worsening, potential complications and reasons to return to ER.    Patient/Family voiced understanding of above instructions.    Discussed plan for discharge, as there is no emergent indication for admission.  Pt/family is agreeable and understands need for follow up and possible repeat testing.  Pt/family is aware that discharge does not mean that nothing is wrong but that it indicates no emergency is currently present that requires admission and they must continue care with follow-up as given below or with a physician of their choice.     FOLLOW-UP  [facility] CARDIOLOG[facility]  [address]  [address]  [facility] [id number] [phone]        PATIENT CONNECTION - [facility] [address]  [phone]  Call   If you need to establish with a primary doctor.         Medication List        New Prescriptions      ibuprofen 600 MG tablet  Commonly known as: ADVIL,MOTRIN  Take 1 tablet by mouth Every 6 (Six) Hours As  Needed for Mild Pain.               Where to Get Your Medications        These medications were sent to [id number] - [place], [place] - [address] - [phone]  - [phone]  [address] [address], [facility] [address]      Phone: [phone]   ibuprofen 600 MG tablet             Please note that portions of this document were completed with voice recognition software.        Emeterio Coronado MD  12/23/24 9940

## 2024-12-26 LAB
QT INTERVAL: 386 MS
QT INTERVAL: 388 MS
QTC INTERVAL: 412 MS
QTC INTERVAL: 422 MS

## 2024-12-30 ENCOUNTER — OFFICE VISIT (OUTPATIENT)
Dept: CARDIOLOGY | Facility: HOSPITAL | Age: 54
End: 2024-12-30
Payer: COMMERCIAL

## 2024-12-30 VITALS
SYSTOLIC BLOOD PRESSURE: 131 MMHG | WEIGHT: 198 LBS | HEIGHT: 69 IN | OXYGEN SATURATION: 98 % | DIASTOLIC BLOOD PRESSURE: 89 MMHG | HEART RATE: 82 BPM | BODY MASS INDEX: 29.33 KG/M2

## 2024-12-30 DIAGNOSIS — R03.0 ELEVATED BLOOD PRESSURE READING IN OFFICE WITHOUT DIAGNOSIS OF HYPERTENSION: ICD-10-CM

## 2024-12-30 DIAGNOSIS — R07.89 OTHER CHEST PAIN: Primary | ICD-10-CM

## 2024-12-30 DIAGNOSIS — I25.10 CORONARY ARTERY DISEASE INVOLVING NATIVE CORONARY ARTERY OF NATIVE HEART WITHOUT ANGINA PECTORIS: ICD-10-CM

## 2024-12-30 NOTE — PROGRESS NOTES
Chief Complaint  Chest Pain    Subjective      History of Present Illness {  Problem List  Visit  Diagnosis   Encounters  Notes  Medications  Labs  Result Review Imaging  Media :23}     Nicho Hammond, 54 y.o. male with past medical history significant for GERD, who presents to [facility] for Chest Pain  Patient presented to [facility] ED on 12/23/2024 with chief complaint of chest pain.  Patient stated he had been having acute onset left-sided chest pain with radiation into his back.  Pain began while patient was eating breakfast and he denied pain to be worse with exertion or position changes.  Patient had initially gone to primary care provider where his EKG reportedly showed anterior Q waves and he was told to present to the ED.  Twelve-lead EKG while in the ED showed normal sinus rhythm, rate 68, normal EKG.  Chest x-ray showed no evidence of acute cardiopulmonary abnormality.  CTA showed no PE, no aortic dissection, but did remark moderate atheromatous disease of coronary vessels.  Patient was treated while in the ED with aspirin, and Maalox.  He was discharged with instructions follow-up with heart and valve for further evaluation of above-mentioned findings.    At time of today's evaluation, patient denies any current chest pain.  He also denies any dyspnea on exertion, syncope, near syncope, palpitations, or lower extremity edema.  Patient has been compliant with monitoring his blood pressure at home which she states is well-controlled ranging around 120/80.  Patient states he is quite active at work and walks throughout his shift.  He does not otherwise routinely exercise.    Caffeine intake: 2 cups of coffee in the morning, and couple sodas throughout the day  Water intake: inadequate  Family history: Negative for early onset coronary artery disease      Objective     Vital Signs:   Vitals:    12/30/24 1458 12/30/24 1459   BP: 139/86 131/89   BP  "Location: Left arm Left arm   Patient Position: Sitting Sitting   Pulse: 72 82   SpO2: 97% 98%   Weight: 89.8 kg (198 lb) 89.8 kg (198 lb)   Height: 175.3 cm (69\") 175.3 cm (69\")     Body mass index is 29.24 kg/m².  Physical Exam  Vitals and nursing note reviewed.   Constitutional:       Appearance: Normal appearance.   HENT:      Head: Normocephalic.   Eyes:      Pupils: Pupils are equal, round, and reactive to light.   Cardiovascular:      Rate and Rhythm: Normal rate and regular rhythm.      Pulses: Normal pulses.      Heart sounds: Normal heart sounds. No murmur heard.  Pulmonary:      Effort: Pulmonary effort is normal.      Breath sounds: Normal breath sounds.   Abdominal:      General: Bowel sounds are normal.      Palpations: Abdomen is soft.   Musculoskeletal:         General: Normal range of motion.      Cervical back: Normal range of motion.      Right lower leg: No edema.      Left lower leg: No edema.   Skin:     General: Skin is warm and dry.      Capillary Refill: Capillary refill takes less than 2 seconds.   Neurological:      Mental Status: He is alert and oriented to person, place, and time.   Psychiatric:         Mood and Affect: Mood normal.         Thought Content: Thought content normal.                Data Reviewed:{ Labs  Result Review  Imaging  Med Tab  Media :23}     Lab Results   Component Value Date    WBC 7.13 12/23/2024    HGB 15.6 12/23/2024    HCT 46.4 12/23/2024    MCV 89.2 12/23/2024     12/23/2024      Lab Results   Component Value Date    GLUCOSE 91 12/23/2024    BUN 18 12/23/2024    CREATININE 1.15 12/23/2024     12/23/2024    K 4.3 12/23/2024     12/23/2024    CALCIUM 9.2 12/23/2024    PROTEINTOT 7.3 12/23/2024    ALBUMIN 4.3 12/23/2024    ALT 44 (H) 12/23/2024    AST 32 12/23/2024    ALKPHOS 67 12/23/2024    BILITOT 0.5 12/23/2024    GLOB 3.0 12/23/2024    AGRATIO 1.4 12/23/2024    BCR 15.7 12/23/2024    ANIONGAP 9.0 12/23/2024    EGFR 75.6 12/23/2024    "   Lab Results   Component Value Date    TROPONINT 6 12/23/2024      ECG 12 Lead ED Triage Standing Order; Chest Pain (12/23/2024 19:51)  ECG 12 Lead ED Triage Standing Order; Chest Pain (12/23/2024 17:51)      Assessment & Plan   Assessment and Plan {CC Problem List  Visit Diagnosis  ROS  Review (Popup)  [facility] [place]  Quality  BestPractice  Medications  SmartSets  SnapShot Encounters  Media :23}     1. Other chest pain  -Most recent ED evaluation reviewed and discussed today with patient including EKG, labs, and chest x-ray  -Patient symptoms described recently as atypical in nature.  However, there was mention of moderate calcification of his coronary arteries on recent CT scan which does require further evaluation in combination with recent symptoms.  -Will plan to proceed with coronary calcium score to truly assess for burden of atherosclerotic disease as this was reported to have been moderate on his recent CT scan  -Will obtain echocardiogram to rule out any structural or functional abnormalities  -Will also update fasting lipid panel as patient states he has not had his lipids drawn within the last year  - Adult Transthoracic Echo Complete W/ Cont if Necessary Per Protocol; Future  - Lipid Panel; Future  - CT Cardiac Calcium Score Without Dye; Future    2. Coronary artery disease involving native coronary artery of native heart without angina pectoris  -Recent CTA mentions moderate burden of coronary atherosclerosis.  We will plan to evaluate this further in patient with recent ED evaluation for chest pain in form of coronary calcium score, and echocardiogram  - Adult Transthoracic Echo Complete W/ Cont if Necessary Per Protocol; Future  - Lipid Panel; Future  - CT Cardiac Calcium Score Without Dye; Future    3.  Elevated blood pressure reading in office without diagnosis of hypertension  -Patient noted to have mildly elevated blood pressure readings while in the ED, and again during today's  evaluation.  Patient will continue to participate in ambulatory blood pressure monitoring.  Patient has been monitoring his blood pressure since ED discharge which she states has been well-controlled.  Patient is aware that his blood pressure goal is consistently less than 140/90.  He will reach out to our office if his blood pressure becomes poorly controlled consistently.    Will plan to follow-up with patient in approximately 4 weeks to discuss results of mentioned testing.  Further recommendations to be made at that time.  Patient may feel free to reach out if his symptoms change or worsen, or if his blood pressures poorly controlled    Follow Up {Instructions Charge Capture  Follow-up Communications :23}     Return in about 4 weeks (around 1/27/2025) for Chest pain, Result review.    Patient was given instructions and counseling regarding his condition or for health maintenance advice. Please see specific information pulled into the AVS if appropriate.  Patient was instructed to call the [facility] with any questions, concerns, or worsening symptoms.    Dictated Utilizing Dragon Dictation   Please note that portions of this note were completed with a voice recognition program.   Part of this note may be an electronic transcription/translation of spoken language to printed text using the Dragon Dictation System.

## 2025-01-03 ENCOUNTER — HOSPITAL ENCOUNTER (OUTPATIENT)
Facility: HOSPITAL | Age: 55
Discharge: HOME | End: 2025-01-03
Admitting: NURSE PRACTITIONER
Payer: COMMERCIAL

## 2025-01-03 ENCOUNTER — TELEPHONE (OUTPATIENT)
Dept: CARDIOLOGY | Facility: HOSPITAL | Age: 55
End: 2025-01-03
Payer: COMMERCIAL

## 2025-01-03 VITALS
SYSTOLIC BLOOD PRESSURE: 135 MMHG | DIASTOLIC BLOOD PRESSURE: 78 MMHG | BODY MASS INDEX: 29.32 KG/M2 | HEIGHT: 69 IN | WEIGHT: 197.97 LBS

## 2025-01-03 DIAGNOSIS — I25.10 CORONARY ARTERY DISEASE INVOLVING NATIVE CORONARY ARTERY OF NATIVE HEART WITHOUT ANGINA PECTORIS: ICD-10-CM

## 2025-01-03 DIAGNOSIS — R07.89 OTHER CHEST PAIN: ICD-10-CM

## 2025-01-03 LAB
ASCENDING AORTA: 3.8 CM
AV MEAN PRESS GRAD SYS DOP V1V2: 3 MMHG
AV VMAX SYS DOP: 112 CM/SEC
BH CV ECHO MEAS - AO MAX PG: 5 MMHG
BH CV ECHO MEAS - AO ROOT DIAM: 3.7 CM
BH CV ECHO MEAS - AO V2 VTI: 23.9 CM
BH CV ECHO MEAS - AVA(I,D): 3.1 CM2
BH CV ECHO MEAS - EDV(CUBED): 110.6 ML
BH CV ECHO MEAS - EDV(MOD-SP2): 79.3 ML
BH CV ECHO MEAS - EDV(MOD-SP4): 107 ML
BH CV ECHO MEAS - EF(MOD-SP2): 63.9 %
BH CV ECHO MEAS - EF(MOD-SP4): 59.9 %
BH CV ECHO MEAS - ESV(CUBED): 27 ML
BH CV ECHO MEAS - ESV(MOD-SP2): 28.6 ML
BH CV ECHO MEAS - ESV(MOD-SP4): 42.9 ML
BH CV ECHO MEAS - FS: 37.5 %
BH CV ECHO MEAS - IVS/LVPW: 1 CM
BH CV ECHO MEAS - IVSD: 1 CM
BH CV ECHO MEAS - LA DIMENSION: 3.1 CM
BH CV ECHO MEAS - LAT PEAK E' VEL: 8.8 CM/SEC
BH CV ECHO MEAS - LV DIASTOLIC VOL/BSA (35-75): 52 CM2
BH CV ECHO MEAS - LV MASS(C)D: 170.2 GRAMS
BH CV ECHO MEAS - LV MAX PG: 3.8 MMHG
BH CV ECHO MEAS - LV MEAN PG: 2 MMHG
BH CV ECHO MEAS - LV SYSTOLIC VOL/BSA (12-30): 20.9 CM2
BH CV ECHO MEAS - LV V1 MAX: 96.9 CM/SEC
BH CV ECHO MEAS - LV V1 VTI: 21.4 CM
BH CV ECHO MEAS - LVIDD: 4.8 CM
BH CV ECHO MEAS - LVIDS: 3 CM
BH CV ECHO MEAS - LVOT AREA: 3.5 CM2
BH CV ECHO MEAS - LVOT DIAM: 2.1 CM
BH CV ECHO MEAS - LVPWD: 1 CM
BH CV ECHO MEAS - MED PEAK E' VEL: 7.4 CM/SEC
BH CV ECHO MEAS - MV A MAX VEL: 64.5 CM/SEC
BH CV ECHO MEAS - MV DEC SLOPE: 242 CM/SEC2
BH CV ECHO MEAS - MV DEC TIME: 0.24 SEC
BH CV ECHO MEAS - MV E MAX VEL: 74.4 CM/SEC
BH CV ECHO MEAS - MV E/A: 1.15
BH CV ECHO MEAS - MV MAX PG: 2.11 MMHG
BH CV ECHO MEAS - MV MEAN PG: 1 MMHG
BH CV ECHO MEAS - MV P1/2T: 89.3 MSEC
BH CV ECHO MEAS - MV V2 VTI: 20.3 CM
BH CV ECHO MEAS - MVA(P1/2T): 2.46 CM2
BH CV ECHO MEAS - MVA(VTI): 3.7 CM2
BH CV ECHO MEAS - PA ACC TIME: 0.14 SEC
BH CV ECHO MEAS - SV(LVOT): 74.1 ML
BH CV ECHO MEAS - SV(MOD-SP2): 50.7 ML
BH CV ECHO MEAS - SV(MOD-SP4): 64.1 ML
BH CV ECHO MEAS - SVI(LVOT): 36 ML/M2
BH CV ECHO MEAS - SVI(MOD-SP2): 24.6 ML/M2
BH CV ECHO MEAS - SVI(MOD-SP4): 31.2 ML/M2
BH CV ECHO MEAS - TAPSE (>1.6): 2.26 CM
BH CV ECHO MEAS - TR MAX PG: 11 MMHG
BH CV ECHO MEAS - TR MAX VEL: 166 CM/SEC
BH CV ECHO MEASUREMENTS AVERAGE E/E' RATIO: 9.19
BH CV XLRA - RV BASE: 3.6 CM
BH CV XLRA - RV LENGTH: 7.9 CM
BH CV XLRA - RV MID: 2.7 CM
BH CV XLRA - TDI S': 12 CM/SEC
LEFT ATRIUM VOLUME INDEX: 37.8 ML/M2
LV EF 3D SEGMENTATION: 60 %
LV EF BIPLANE MOD: 61.9 %

## 2025-01-03 PROCEDURE — 93306 TTE W/DOPPLER COMPLETE: CPT

## 2025-01-03 PROCEDURE — 93306 TTE W/DOPPLER COMPLETE: CPT | Performed by: INTERNAL MEDICINE

## 2025-01-03 NOTE — PROGRESS NOTES
Please notify patient:  His echocardiogram is within acceptable limits.  His heart contracts normally.  There are no significant valvular abnormalities noted. We can discuss these results in further detail at our next follow-up appointment.

## 2025-01-03 NOTE — TELEPHONE ENCOUNTER
----- Message from Alba Tone sent at 1/3/2025 11:41 AM EST -----  Please notify patient:  His echocardiogram is within acceptable limits.  His heart contracts normally.  There are no significant valvular abnormalities noted. We can discuss these results in further detail at our next follow-up appointment.

## 2025-01-04 ENCOUNTER — LAB (OUTPATIENT)
Dept: LAB | Facility: HOSPITAL | Age: 55
End: 2025-01-04
Payer: COMMERCIAL

## 2025-01-04 DIAGNOSIS — R07.89 OTHER CHEST PAIN: ICD-10-CM

## 2025-01-04 DIAGNOSIS — I25.10 CORONARY ARTERY DISEASE INVOLVING NATIVE CORONARY ARTERY OF NATIVE HEART WITHOUT ANGINA PECTORIS: ICD-10-CM

## 2025-01-04 LAB
CHOLEST SERPL-MCNC: 208 MG/DL (ref 0–200)
HDLC SERPL-MCNC: 35 MG/DL (ref 40–60)
LDLC SERPL CALC-MCNC: 134 MG/DL (ref 0–100)
LDLC/HDLC SERPL: 3.7 {RATIO}
TRIGL SERPL-MCNC: 217 MG/DL (ref 0–150)
VLDLC SERPL-MCNC: 39 MG/DL (ref 5–40)

## 2025-01-04 PROCEDURE — 36415 COLL VENOUS BLD VENIPUNCTURE: CPT

## 2025-01-04 PROCEDURE — 80061 LIPID PANEL: CPT

## 2025-01-29 ENCOUNTER — OFFICE VISIT (OUTPATIENT)
Dept: CARDIOLOGY | Facility: HOSPITAL | Age: 55
End: 2025-01-29
Payer: COMMERCIAL

## 2025-01-29 VITALS
RESPIRATION RATE: 18 BRPM | HEART RATE: 71 BPM | BODY MASS INDEX: 29.33 KG/M2 | OXYGEN SATURATION: 96 % | DIASTOLIC BLOOD PRESSURE: 75 MMHG | SYSTOLIC BLOOD PRESSURE: 129 MMHG | HEIGHT: 69 IN | WEIGHT: 198 LBS

## 2025-01-29 DIAGNOSIS — E78.2 MIXED HYPERLIPIDEMIA: ICD-10-CM

## 2025-01-29 DIAGNOSIS — R03.0 ELEVATED BLOOD PRESSURE READING IN OFFICE WITHOUT DIAGNOSIS OF HYPERTENSION: ICD-10-CM

## 2025-01-29 DIAGNOSIS — R07.89 OTHER CHEST PAIN: Primary | ICD-10-CM

## 2025-01-29 DIAGNOSIS — I25.10 CORONARY ARTERY DISEASE INVOLVING NATIVE CORONARY ARTERY OF NATIVE HEART WITHOUT ANGINA PECTORIS: ICD-10-CM

## 2025-01-29 RX ORDER — ROSUVASTATIN CALCIUM 10 MG/1
5 TABLET, COATED ORAL DAILY
Qty: 30 TABLET | Refills: 1 | Status: SHIPPED | OUTPATIENT
Start: 2025-01-29 | End: 2025-01-30 | Stop reason: SDUPTHER

## 2025-01-29 RX ORDER — ASPIRIN 81 MG/1
81 TABLET ORAL DAILY
Start: 2025-01-29

## 2025-01-29 NOTE — PROGRESS NOTES
Chief Complaint  Follow-up and Chest Pain    Subjective      History of Present Illness {CC  Problem List  Visit  Diagnosis   Encounters  Notes  Medications  Labs  Result Review Imaging  Media :23}     Nicho Hammond, 55 y.o. male with past medical history significant for GERD, who presents to [facility] for Follow-up and Chest Pain  Since time of previous evaluation, patient has undergone echocardiogram, and coronary calcium score.  Patient has been very compliant with monitoring his blood pressures at home which have been overall well-controlled.  His blood pressure has been ranging from 115/78, up to an isolated reading of 160/82.  On average, it appears to be around 120/80.  Patient currently denies any additional episodes of chest discomfort.  He specifically denies exertional chest pain or pressure, dyspnea on exertion, syncope, near syncope, or lower extremity edema.      Echocardiogram 1/3/2025:    Left ventricular systolic function is normal. Calculated left ventricular EF = 61.9% Left ventricular ejection fraction appears to be 61 - 65%.    Left ventricular diastolic function was normal.    CT coronary calcium score 1/21/2025:  Coronary calcium score of 194 placing patient in the 80th percentile rank for gender and age.  Moderate risk for atherosclerotic plaque.        Initial presentation:  Patient presented to [facility] ED on 12/23/2024 with chief complaint of chest pain.  Patient stated he had been having acute onset left-sided chest pain with radiation into his back.  Pain began while patient was eating breakfast and he denied pain to be worse with exertion or position changes.  Patient had initially gone to primary care provider where his EKG reportedly showed anterior Q waves and he was told to present to the ED.  Twelve-lead EKG while in the ED showed normal sinus rhythm, rate 68, normal EKG.  Chest x-ray showed no evidence of acute  "cardiopulmonary abnormality.  CTA showed no PE, no aortic dissection, but did remark moderate atheromatous disease of coronary vessels.  Patient was treated while in the ED with aspirin, and Maalox.  He was discharged with instructions follow-up with heart and valve for further evaluation of above-mentioned findings.    At time of today's evaluation, patient denies any current chest pain.  He also denies any dyspnea on exertion, syncope, near syncope, palpitations, or lower extremity edema.  Patient has been compliant with monitoring his blood pressure at home which she states is well-controlled ranging around 120/80.  Patient states he is quite active at work and walks throughout his shift.  He does not otherwise routinely exercise.    Caffeine intake: 2 cups of coffee in the morning, and couple sodas throughout the day  Water intake: inadequate  Family history: Negative for early onset coronary artery disease      Objective     Vital Signs:   Vitals:    01/29/25 1500   BP: 129/75   BP Location: Left arm   Patient Position: Sitting   Cuff Size: Adult   Pulse: 71   Resp: 18   SpO2: 96%   Weight: 89.8 kg (198 lb)   Height: 175.3 cm (69.02\")       Body mass index is 29.22 kg/m².  Physical Exam  Vitals and nursing note reviewed.   Constitutional:       Appearance: Normal appearance.   HENT:      Head: Normocephalic.   Eyes:      Pupils: Pupils are equal, round, and reactive to light.   Cardiovascular:      Rate and Rhythm: Normal rate and regular rhythm.      Pulses: Normal pulses.      Heart sounds: Normal heart sounds. No murmur heard.  Pulmonary:      Effort: Pulmonary effort is normal.      Breath sounds: Normal breath sounds.   Abdominal:      General: Bowel sounds are normal.      Palpations: Abdomen is soft.   Musculoskeletal:         General: Normal range of motion.      Cervical back: Normal range of motion.      Right lower leg: No edema.      Left lower leg: No edema.   Skin:     General: Skin is warm and " dry.      Capillary Refill: Capillary refill takes less than 2 seconds.   Neurological:      Mental Status: He is alert and oriented to person, place, and time.   Psychiatric:         Mood and Affect: Mood normal.         Thought Content: Thought content normal.                Data Reviewed:{ Labs  Result Review  Imaging  Med Tab  Media :23}     Lab Results   Component Value Date    WBC 7.13 12/23/2024    HGB 15.6 12/23/2024    HCT 46.4 12/23/2024    MCV 89.2 12/23/2024     12/23/2024      Lab Results   Component Value Date    GLUCOSE 91 12/23/2024    BUN 18 12/23/2024    CREATININE 1.15 12/23/2024     12/23/2024    K 4.3 12/23/2024     12/23/2024    CALCIUM 9.2 12/23/2024    PROTEINTOT 7.3 12/23/2024    ALBUMIN 4.3 12/23/2024    ALT 44 (H) 12/23/2024    AST 32 12/23/2024    ALKPHOS 67 12/23/2024    BILITOT 0.5 12/23/2024    GLOB 3.0 12/23/2024    AGRATIO 1.4 12/23/2024    BCR 15.7 12/23/2024    ANIONGAP 9.0 12/23/2024    EGFR 75.6 12/23/2024      Lab Results   Component Value Date    TROPONINT 6 12/23/2024      Lab Results   Component Value Date    CHOL 208 (H) 01/04/2025    TRIG 217 (H) 01/04/2025    HDL 35 (L) 01/04/2025     (H) 01/04/2025      ECG 12 Lead ED Triage Standing Order; Chest Pain (12/23/2024 19:51)  ECG 12 Lead ED Triage Standing Order; Chest Pain (12/23/2024 17:51)      Assessment & Plan   Assessment and Plan {CC Problem List  Visit Diagnosis  ROS  Review (Popup)  Health Maintenance  Quality  BestPractice  Medications  SmartSets  SnapShot Encounters  Media :23}     1. Other chest pain  -Patient denies additional episodes of chest pain or pressure since previous evaluation  -We have reviewed and discussed most recent cardiac testing including coronary calcium score, echocardiogram, and lipid panel  Echocardiogram 1/3/2025:    Left ventricular systolic function is normal. Calculated left ventricular EF = 61.9% Left ventricular ejection fraction appears to be  61 - 65%.    Left ventricular diastolic function was normal.    CT coronary calcium score 1/21/2025:  Coronary calcium score of 194 placing patient in the 80th percentile rank for gender and age.  Moderate risk for atherosclerotic plaque.    2. Coronary artery disease involving native coronary artery of native heart without angina pectoris  -CT calcium score with a score of 194.  Upon further review, it is noted that the majority of patients atherosclerotic plaque appears to be in his LAD.  -Due to localization of plaque, recommend patient to initiate statin medication at this time  -Also recommend patient to initiate aspirin 81 mg once daily  -At this time, it would be reasonable for patient to establish care with cardiology physician for ongoing monitoring and management of coronary artery disease  -Referral placed to cardiology, and patient is agreeable to this plan      3.  Elevated blood pressure reading in office without diagnosis of hypertension  -Patient with well-controlled blood pressures upon ambulatory monitoring.  Discussed with patient that he should continue to monitor blood pressure intermittently and notify heart and valve if his blood pressure becomes consistently higher than 140/90.    4.  Mixed hyperlipidemia  -Due to mildly elevated LDL, and combination with localized atherosclerotic disease of the LAD, patient recommended to start statin medication  -Patient initiated on 10 mg of rosuvastatin to be taken once daily  -He is aware of the recommendation to obtain metabolic panel in 6 weeks to assess for liver function  -We will recommend patient repeat lipid panel in 3 to 6 months or at discretion of cardiology physician.        Patient may follow-up with heart and valve on an as-needed basis.  He will establish care with cardiology, Dr. Miner for long-term monitoring and management.    Follow Up {Instructions Charge Capture  Follow-up Communications :23}     Return if symptoms worsen or fail  to improve.    Patient was given instructions and counseling regarding his condition or for health maintenance advice. Please see specific information pulled into the AVS if appropriate.  Patient was instructed to call the [facility] with any questions, concerns, or worsening symptoms.    Dictated Utilizing Dragon Dictation   Please note that portions of this note were completed with a voice recognition program.   Part of this note may be an electronic transcription/translation of spoken language to printed text using the Dragon Dictation System.

## 2025-01-29 NOTE — PATIENT INSTRUCTIONS
Discharge instructions given to and reviewed with patient, verbalized understanding. Start to take Crestor (rosuvastatin) once per day (at night)  Have labs checked around 3/16 for metabolic panel (NOT fasting)  Will check labs in 3-6 months for lipids  Start taking aspirin 81 mg daily

## 2025-01-30 RX ORDER — ROSUVASTATIN CALCIUM 10 MG/1
10 TABLET, COATED ORAL DAILY
Qty: 30 TABLET | Refills: 1 | Status: SHIPPED | OUTPATIENT
Start: 2025-01-30

## 2025-03-03 ENCOUNTER — OFFICE VISIT (OUTPATIENT)
Dept: CARDIOLOGY | Facility: CLINIC | Age: 55
End: 2025-03-03
Payer: COMMERCIAL

## 2025-03-03 VITALS
HEART RATE: 86 BPM | OXYGEN SATURATION: 97 % | WEIGHT: 195.8 LBS | SYSTOLIC BLOOD PRESSURE: 114 MMHG | BODY MASS INDEX: 28.03 KG/M2 | DIASTOLIC BLOOD PRESSURE: 84 MMHG | HEIGHT: 70 IN

## 2025-03-03 DIAGNOSIS — I25.10 CORONARY ARTERY DISEASE INVOLVING NATIVE CORONARY ARTERY OF NATIVE HEART WITHOUT ANGINA PECTORIS: ICD-10-CM

## 2025-03-03 DIAGNOSIS — R07.2 PRECORDIAL PAIN: Primary | ICD-10-CM

## 2025-03-03 DIAGNOSIS — E78.5 HYPERLIPIDEMIA LDL GOAL <70: ICD-10-CM

## 2025-03-03 PROCEDURE — 99214 OFFICE O/P EST MOD 30 MIN: CPT | Performed by: INTERNAL MEDICINE

## 2025-03-03 RX ORDER — ROSUVASTATIN CALCIUM 10 MG/1
10 TABLET, COATED ORAL DAILY
Qty: 90 TABLET | Refills: 3 | Status: SHIPPED | OUTPATIENT
Start: 2025-03-03

## 2025-03-03 NOTE — PROGRESS NOTES
"[facility]  Office visit  Nicho Hammond  1970  [phone]  There is no work phone number on file.    VISIT DATE:  3/3/2025    PCP: Hao Foster MD  No address on file    CC:  Chief Complaint   Patient presents with    Coronary Artery Disease       Previous cardiac studies and procedures:  January 2025 TTE    Left ventricular systolic function is normal. Calculated left ventricular EF = 61.9% Left ventricular ejection fraction appears to be 61 - 65%.    Left ventricular diastolic function was normal.    ASSESSMENT:   Diagnosis Plan   1. Precordial pain  Treadmill Stress Test    Lipoprotein A (LPA)      2. Coronary artery disease involving native coronary artery of native heart without angina pectoris  rosuvastatin (CRESTOR) 10 MG tablet      3. Hyperlipidemia LDL goal <70  Lipid Panel    Comprehensive Metabolic Panel          PLAN:  Exercise treadmill test for coronary ischemia evaluation    Hyperlipidemia: Goal LDL less than 70.  Goal HDL greater than 40.  Continue plant-based diet and regular exercise.  Repeat lipid profile pending on current dose of rosuvastatin.  LP(a) pending.    Coronary calcifications: Continue aggressive risk factor modification.  Ischemia evaluation pending.    Subjective  No recurrent episodes of precordial chest discomfort.  Blood pressures running less than 130/80 mmHg.  He is compliant with medical therapy.  Has been on rosuvastatin for approximately 4 weeks.  Non-smoker.  Nondiabetic.  + Family history of atherosclerosis.    PHYSICAL EXAMINATION:  Vitals:    03/03/25 1406   BP: 114/84   BP Location: Left arm   Patient Position: Sitting   Cuff Size: Adult   Pulse: 86   SpO2: 97%   Weight: 88.8 kg (195 lb 12.8 oz)   Height: 177.8 cm (70\")     General Appearance:    Alert, cooperative, no distress, appears stated age   Head:    Normocephalic, without obvious abnormality, atraumatic   Eyes:    conjunctiva/corneas clear " "  Nose:   Nares normal, septum midline, mucosa normal, no drainage   Throat:   Lips, teeth and gums normal   Neck:   Supple, symmetrical, trachea midline, no carotid    bruit or JVD   Lungs:     Clear to auscultation bilaterally, respirations unlabored   Chest Wall:    No tenderness or deformity    Heart:    Regular rate and rhythm, S1 and S2 normal, no murmur, rub   or gallop, normal carotid impulse bilaterally without bruit.   Abdomen:     Soft, non-tender   Extremities:   Extremities normal, atraumatic, no cyanosis or edema   Pulses:   2+ and symmetric all extremities   Skin:   Skin color, texture, turgor normal, no rashes or lesions       Diagnostic Data:  Procedures  Lab Results   Component Value Date    TRIG 217 (H) 01/04/2025    HDL 35 (L) 01/04/2025     Lab Results   Component Value Date    GLUCOSE 91 12/23/2024    BUN 18 12/23/2024    CREATININE 1.15 12/23/2024     12/23/2024    K 4.3 12/23/2024     12/23/2024    CO2 29.0 12/23/2024     No results found for: \"HGBA1C\"  Lab Results   Component Value Date    WBC 7.13 12/23/2024    HGB 15.6 12/23/2024    HCT 46.4 12/23/2024     12/23/2024       Allergies  No Known Allergies    Current Medications    Current Outpatient Medications:     aspirin 81 MG EC tablet, Take 1 tablet by mouth Daily., Disp: , Rfl:     ibuprofen (ADVIL,MOTRIN) 600 MG tablet, Take 1 tablet by mouth Every 6 (Six) Hours As Needed for Mild Pain., Disp: 20 tablet, Rfl: 0    omeprazole (priLOSEC) 20 MG capsule, Take 1 capsule by mouth As Needed (acid reflux)., Disp: , Rfl:     rosuvastatin (CRESTOR) 10 MG tablet, Take 1 tablet by mouth Daily., Disp: 90 tablet, Rfl: 3          ROS  ROS      SOCIAL HX  Social History     Socioeconomic History    Marital status:    Tobacco Use    Smoking status: Never     Passive exposure: Never    Smokeless tobacco: Never   Vaping Use    Vaping status: Never Used   Substance and Sexual Activity    Alcohol use: Yes     Comment: monthly    " Drug use: Never    Sexual activity: Defer       FAMILY HX  Family History   Problem Relation Age of Onset    Arrhythmia Mother     Heart disease Mother     Heart attack Mother     No Known Problems Father     No Known Problems Sister     No Known Problems Brother              Power Miner III, MD, FACC

## 2025-03-12 ENCOUNTER — HOSPITAL ENCOUNTER (OUTPATIENT)
Dept: CARDIOLOGY | Facility: HOSPITAL | Age: 55
Discharge: HOME | End: 2025-03-12
Admitting: INTERNAL MEDICINE
Payer: COMMERCIAL

## 2025-03-12 VITALS
SYSTOLIC BLOOD PRESSURE: 118 MMHG | WEIGHT: 195.77 LBS | HEIGHT: 70 IN | HEART RATE: 99 BPM | DIASTOLIC BLOOD PRESSURE: 82 MMHG | BODY MASS INDEX: 28.03 KG/M2

## 2025-03-12 LAB
BH CV STRESS BP STAGE 1: NORMAL
BH CV STRESS BP STAGE 2: NORMAL
BH CV STRESS BP STAGE 3: NORMAL
BH CV STRESS DURATION MIN STAGE 1: 3
BH CV STRESS DURATION MIN STAGE 2: 3
BH CV STRESS DURATION MIN STAGE 3: 3
BH CV STRESS DURATION SEC STAGE 1: 0
BH CV STRESS DURATION SEC STAGE 2: 0
BH CV STRESS DURATION SEC STAGE 3: 0
BH CV STRESS GRADE STAGE 1: 10
BH CV STRESS GRADE STAGE 2: 12
BH CV STRESS GRADE STAGE 3: 14
BH CV STRESS HR STAGE 1: 118
BH CV STRESS HR STAGE 2: 125
BH CV STRESS HR STAGE 3: 142
BH CV STRESS METS STAGE 1: 5
BH CV STRESS METS STAGE 2: 7.5
BH CV STRESS METS STAGE 3: 10
BH CV STRESS O2 STAGE 1: 95
BH CV STRESS O2 STAGE 2: 96
BH CV STRESS O2 STAGE 3: 95
BH CV STRESS PROTOCOL 1: NORMAL
BH CV STRESS RECOVERY BP: NORMAL MMHG
BH CV STRESS RECOVERY HR: 98 BPM
BH CV STRESS RECOVERY O2: 96 %
BH CV STRESS SPEED STAGE 1: 1.7
BH CV STRESS SPEED STAGE 2: 2.5
BH CV STRESS SPEED STAGE 3: 3.4
BH CV STRESS STAGE 1: 1
BH CV STRESS STAGE 2: 2
BH CV STRESS STAGE 3: 3
MAXIMAL PREDICTED HEART RATE: 165 BPM
PERCENT MAX PREDICTED HR: 86.06 %
STRESS BASELINE BP: NORMAL MMHG
STRESS BASELINE HR: 99 BPM
STRESS O2 SAT REST: 95 %
STRESS PERCENT HR: 101 %
STRESS POST ESTIMATED WORKLOAD: 10.4 METS
STRESS POST EXERCISE DUR MIN: 9 MIN
STRESS POST EXERCISE DUR SEC: 0 SEC
STRESS POST O2 SAT PEAK: 95 %
STRESS POST PEAK BP: NORMAL MMHG
STRESS POST PEAK HR: 142 BPM
STRESS TARGET HR: 140 BPM

## 2025-03-12 PROCEDURE — 93017 CV STRESS TEST TRACING ONLY: CPT

## 2025-06-04 ENCOUNTER — OFFICE VISIT (OUTPATIENT)
Dept: CARDIOLOGY | Facility: CLINIC | Age: 55
End: 2025-06-04
Payer: COMMERCIAL

## 2025-06-04 VITALS
OXYGEN SATURATION: 98 % | BODY MASS INDEX: 28.88 KG/M2 | HEIGHT: 69 IN | SYSTOLIC BLOOD PRESSURE: 134 MMHG | HEART RATE: 85 BPM | DIASTOLIC BLOOD PRESSURE: 88 MMHG | WEIGHT: 195 LBS

## 2025-06-04 DIAGNOSIS — E78.2 MIXED HYPERLIPIDEMIA: Primary | ICD-10-CM

## 2025-06-04 PROCEDURE — 99213 OFFICE O/P EST LOW 20 MIN: CPT | Performed by: INTERNAL MEDICINE

## 2025-06-04 NOTE — PROGRESS NOTES
"[facility]  Office visit  Nicho Hammond  1970  [phone]  There is no work phone number on file.    VISIT DATE:  6/4/2025    PCP: Provider, No Known  [facility] [id number]    CC:  Chief Complaint   Patient presents with    Slow Heart Rate    Precordial pain       Previous cardiac studies and procedures:  January 2025 TTE    Left ventricular systolic function is normal. Calculated left ventricular EF = 61.9% Left ventricular ejection fraction appears to be 61 - 65%.    Left ventricular diastolic function was normal.  March 2025 exercise treadmill test    Patient denied any chest discomfort/pain, or any other symptoms during exercise.    Expected exercise duration 9:40, actual 9:00.    No ECG evidence of myocardial ischemia.    Findings consistent with a normal ECG stress test.    ASSESSMENT:   Diagnosis Plan   1. Mixed hyperlipidemia  Lipid Panel    Comprehensive Metabolic Panel    Lipoprotein A (LPA)            PLAN:  Hyperlipidemia: Goal LDL less than 70.  Goal HDL greater than 40.  Continue plant-based diet and regular exercise.  Repeat lipid profile pending on current dose of rosuvastatin.  LP(a) pending.    Coronary calcifications: Continue aggressive risk factor modification.     Subjective  No recurrent episodes of precordial chest discomfort.  Blood pressures running less than 130/80 mmHg.  He is compliant with medical therapy.    Non-smoker.  Nondiabetic.  + Family history of atherosclerosis.    PHYSICAL EXAMINATION:  Vitals:    06/04/25 1501   BP: 134/88   BP Location: Left arm   Patient Position: Sitting   Pulse: 85   SpO2: 98%   Weight: 88.5 kg (195 lb)   Height: 175.3 cm (69\")     General Appearance:    Alert, cooperative, no distress, appears stated age   Head:    Normocephalic, without obvious abnormality, atraumatic   Eyes:    conjunctiva/corneas clear   Nose:   Nares normal, septum midline, mucosa normal, no drainage   Throat: " "  Lips, teeth and gums normal   Neck:   Supple, symmetrical, trachea midline, no carotid    bruit or JVD   Lungs:     Clear to auscultation bilaterally, respirations unlabored   Chest Wall:    No tenderness or deformity    Heart:    Regular rate and rhythm, S1 and S2 normal, no murmur, rub   or gallop, normal carotid impulse bilaterally without bruit.   Abdomen:     Soft, non-tender   Extremities:   Extremities normal, atraumatic, no cyanosis or edema   Pulses:   2+ and symmetric all extremities   Skin:   Skin color, texture, turgor normal, no rashes or lesions       Diagnostic Data:  Procedures  Lab Results   Component Value Date    TRIG 217 (H) 01/04/2025    HDL 35 (L) 01/04/2025     Lab Results   Component Value Date    GLUCOSE 91 12/23/2024    BUN 18 12/23/2024    CREATININE 1.15 12/23/2024     12/23/2024    K 4.3 12/23/2024     12/23/2024    CO2 29.0 12/23/2024     No results found for: \"HGBA1C\"  Lab Results   Component Value Date    WBC 7.13 12/23/2024    HGB 15.6 12/23/2024    HCT 46.4 12/23/2024     12/23/2024       Allergies  No Known Allergies    Current Medications    Current Outpatient Medications:     aspirin 81 MG EC tablet, Take 1 tablet by mouth Daily., Disp: , Rfl:     ibuprofen (ADVIL,MOTRIN) 600 MG tablet, Take 1 tablet by mouth Every 6 (Six) Hours As Needed for Mild Pain., Disp: 20 tablet, Rfl: 0    omeprazole (priLOSEC) 20 MG capsule, Take 1 capsule by mouth As Needed (acid reflux)., Disp: , Rfl:     rosuvastatin (CRESTOR) 10 MG tablet, Take 1 tablet by mouth Daily., Disp: 90 tablet, Rfl: 3          ROS  ROS      SOCIAL HX  Social History     Socioeconomic History    Marital status:    Tobacco Use    Smoking status: Never     Passive exposure: Never    Smokeless tobacco: Never   Vaping Use    Vaping status: Never Used    Passive vaping exposure: Yes   Substance and Sexual Activity    Alcohol use: Yes     Comment: monthly    Drug use: Never    Sexual activity: Defer "       FAMILY HX  Family History   Problem Relation Age of Onset    Arrhythmia Mother     Heart disease Mother     Heart attack Mother     No Known Problems Father     No Known Problems Sister     No Known Problems Brother              Power Miner III, MD, FACC

## 2025-07-29 ENCOUNTER — TELEPHONE (OUTPATIENT)
Dept: CARDIOLOGY | Facility: CLINIC | Age: 55
End: 2025-07-29
Payer: COMMERCIAL

## 2025-07-29 DIAGNOSIS — G44.82 HEADACHE ASSOCIATED WITH SEXUAL ACTIVITY: Primary | ICD-10-CM

## 2025-07-29 NOTE — TELEPHONE ENCOUNTER
Per     Not related to heart issues. Need to obtain CT head without contrast. Diagnosis headache. If CT head without contrast is unremarkable, will proceed to CTA head and neck and neurology referral.     Message sent via [employer].

## 2025-07-31 ENCOUNTER — PATIENT MESSAGE (OUTPATIENT)
Dept: CARDIOLOGY | Facility: CLINIC | Age: 55
End: 2025-07-31
Payer: COMMERCIAL

## 2025-08-12 ENCOUNTER — RESULTS FOLLOW-UP (OUTPATIENT)
Dept: CARDIOLOGY | Facility: CLINIC | Age: 55
End: 2025-08-12
Payer: COMMERCIAL

## 2025-08-12 DIAGNOSIS — G44.82 HEADACHE ASSOCIATED WITH SEXUAL ACTIVITY: Primary | ICD-10-CM

## 2025-08-19 ENCOUNTER — HOSPITAL ENCOUNTER (OUTPATIENT)
Facility: HOSPITAL | Age: 55
Discharge: HOME | End: 2025-08-19
Payer: COMMERCIAL

## 2025-08-19 DIAGNOSIS — G44.82 HEADACHE ASSOCIATED WITH SEXUAL ACTIVITY: ICD-10-CM

## 2025-08-19 PROCEDURE — 25510000001 IOPAMIDOL PER 1 ML: Performed by: INTERNAL MEDICINE

## 2025-08-19 PROCEDURE — 70498 CT ANGIOGRAPHY NECK: CPT

## 2025-08-19 RX ORDER — IOPAMIDOL 755 MG/ML
100 INJECTION, SOLUTION INTRAVASCULAR
Status: COMPLETED | OUTPATIENT
Start: 2025-08-19 | End: 2025-08-19

## 2025-08-19 RX ADMIN — IOPAMIDOL 75 ML: 755 INJECTION, SOLUTION INTRAVENOUS at 17:35
